# Patient Record
Sex: FEMALE | NOT HISPANIC OR LATINO | Employment: STUDENT | URBAN - METROPOLITAN AREA
[De-identification: names, ages, dates, MRNs, and addresses within clinical notes are randomized per-mention and may not be internally consistent; named-entity substitution may affect disease eponyms.]

---

## 2022-10-20 ENCOUNTER — OFFICE VISIT (OUTPATIENT)
Dept: GASTROENTEROLOGY | Facility: CLINIC | Age: 22
End: 2022-10-20
Payer: COMMERCIAL

## 2022-10-20 ENCOUNTER — TELEPHONE (OUTPATIENT)
Dept: GASTROENTEROLOGY | Facility: CLINIC | Age: 22
End: 2022-10-20

## 2022-10-20 VITALS
BODY MASS INDEX: 28.32 KG/M2 | HEIGHT: 65 IN | SYSTOLIC BLOOD PRESSURE: 112 MMHG | WEIGHT: 170 LBS | DIASTOLIC BLOOD PRESSURE: 62 MMHG

## 2022-10-20 DIAGNOSIS — K52.9 COLITIS: Primary | ICD-10-CM

## 2022-10-20 PROCEDURE — 99204 OFFICE O/P NEW MOD 45 MIN: CPT | Performed by: INTERNAL MEDICINE

## 2022-10-20 NOTE — PROGRESS NOTES
[id number] - Outpatient Consultation  Cambria Laxmi 23 y o  female MRN: [id number]  Encounter: [id number]          ASSESSMENT AND PLAN:      1  Colitis  Infectious colitis versus inflammatory bowel disease? With recurrent episodes and a biopsy showing chronic colitis I suspect she has inflammatory bowel disease (Crohn's disease) although she is completely asymptomatic at this point time  Had a long discussion with her and her mother about inflammatory bowel disease and where we go from here  I am reluctant to start aggressively treating her for Crohn's disease being she is asymptomatic  I gave her 2 options of either repeating her colonoscopy now and reassessing the colon 2 months after the acute episode verses continue to follow and consider reimaging or colonoscope being if she has recurrent GI symptoms  She reports that she is in the last year college and is currently taking the [employer] in hopes of getting into medical school next year so would like to get things completely worked out prior to the spring semester  She is interested in pursuing a colonoscopy now  - Colonoscopy; Future    ______________________________________________________________________    HPI:  The patient is seen in the office today for evaluation of 2 episodes of colitis  She reports in January presented to the emergency room with the acute onset of right lower quadrant abdominal pain  In the emergency room she had blood work and an ultrasound that was unrevealing  She was discharged and reports by the next morning felt well  She was doing well until August when while vacationing in [facility] develops severe right-sided abdominal pain radiating to her back  This was similar quality as the January episode but just more severe  She went to the emergency room  Was found to have elevated white count and sed rate  A CT scan showed a high riding cecum with moderate amount of right-sided colitis  She was treated with what sounds like steroids and antibiotics  She had a negative EGD but then a colonoscopy which showed colitis in the right colon  It does not look like the terminal ileum was evaluated  The impression was infectious colitis verses IBD  Biopsies revealed acute chronic colitis  Patient was discharged on antibiotics and sulfasalazine which she took for 2 weeks  She was told that she should follow-up with a gastroenterologist in a night states when she returned home  She reports that she currently moves her bowels about twice a day  They are formed  She denies any rectal bleeding  She denies any abdominal pain  She denies any upper GI symptoms  The only change in her diet she made is that she is avoiding spicy foods  Her weight is stable  She denies any arthritis or arthralgias  REVIEW OF SYSTEMS:    CONSTITUTIONAL: Denies any fever, chills, rigors, and weight loss  HEENT: No earache or tinnitus  Denies hearing loss or visual disturbances  CARDIOVASCULAR: No chest pain or palpitations  RESPIRATORY: Denies any cough, hemoptysis, shortness of breath or dyspnea on exertion  GASTROINTESTINAL: As noted in the History of Present Illness  GENITOURINARY: No problems with urination  Denies any hematuria or dysuria  NEUROLOGIC: No dizziness or vertigo, denies headaches  MUSCULOSKELETAL: Denies any muscle or joint pain  SKIN: Denies skin rashes or itching  ENDOCRINE: Denies excessive thirst  Denies intolerance to heat or cold  PSYCHOSOCIAL: Denies depression or anxiety  Denies any recent memory loss  Historical Information   History reviewed  No pertinent past medical history  History reviewed  No pertinent surgical history    Social History   Social History     Substance and Sexual Activity   Alcohol Use None     Social History     Substance and Sexual Activity   Drug Use Never     Social History     Tobacco Use   Smoking Status Current Some Day Smoker   • Types: E-Cigarettes   Smokeless Tobacco Never Used     History reviewed  No pertinent family history  Meds/Allergies     No current outpatient medications on file  No Known Allergies        Objective     Blood pressure 112/62, height 5' 5" (1 651 m), weight 77 1 kg (170 lb)  Body mass index is 28 29 kg/m²  PHYSICAL EXAM:      General Appearance:   Alert, cooperative, no distress   HEENT:   Normocephalic, atraumatic, anicteric      Neck:  Supple, symmetrical, trachea midline   Lungs:   Clear to auscultation bilaterally; no rales, rhonchi or wheezing; respirations unlabored    Heart[de-identified]   Regular rate and rhythm; no murmur, rub, or gallop  Abdomen:   Soft, non-tender, non-distended; normal bowel sounds; no masses, no organomegaly    Genitalia:   Deferred    Rectal:   Deferred    Extremities:  No cyanosis, clubbing or edema    Pulses:  2+ and symmetric    Skin:  No jaundice, rashes, or lesions    Lymph nodes:  No palpable cervical lymphadenopathy        Lab Results:   Hemoglobin 14 2, hematocrit 41 3, white count 12 4, platelet count 968  ALT 20, AST 23, alkaline phosphatase 81, bilirubin 0 5, albumin 3 4  Sed rate 32  CRP 0 1   Sodium 137, potassium 3 4, chloride 103, bicarb 16, BUN 8, creatinine 0 75  Lipase 12  (8/10/22)    Radiology Results:   CT scan of the abdomen  August 10, 2022  Cecum is high up in position located at the subhepatic region  Marked mural thickening and edema involving the cecum and ascending colon with stranding of pericolonic fat planes and multiple ileocolonic lymph nodes    Mildly enlarged fatty liver

## 2022-10-20 NOTE — LETTER
October 20, 2022     [id number]    Patient: Troy Garcia   YOB: 2000   Date of Visit: 10/20/2022       Dear Dr Pierce Brunner: Thank you for referring Troy Garcia to me for evaluation  Below are my notes for this consultation  If you have questions, please do not hesitate to call me  I look forward to following your patient along with you  Sincerely,        Laverne Babin MD        CC: No Recipients  Laverne Babin MD  10/20/2022  5:27 PM  Incomplete  [id number] - Outpatient [facility]  Troy Garcia 23 y o  female MRN: [id number]  Encounter: [id number]          ASSESSMENT AND PLAN:      1  Colitis  Infectious colitis versus inflammatory bowel disease? With recurrent episodes and a biopsy showing chronic colitis I suspect she has inflammatory bowel disease (Crohn's disease) although she is completely asymptomatic at this point time  Had a long discussion with her and her mother about inflammatory bowel disease and where we go from here  I am reluctant to start aggressively treating her for Crohn's disease being she is asymptomatic  I gave her 2 options of either repeating her colonoscopy now and reassessing the colon 2 months after the acute episode verses continue to follow and consider reimaging or colonoscope being if she has recurrent GI symptoms  She reports that she is in the last year college and is currently taking the [employer] in hopes of getting into medical school next year so would like to get things completely worked out prior to the spring semester  She is interested in pursuing a colonoscopy now  - Colonoscopy; Future    ______________________________________________________________________    HPI:  The patient is seen in the office today for evaluation of 2 episodes of colitis    She reports in January presented to the emergency room with the acute onset of right lower quadrant abdominal pain  In the emergency room she had blood work and an ultrasound that was unrevealing  She was discharged and reports by the next morning felt well  She was doing well until August when while vacationing in [facility] develops severe right-sided abdominal pain radiating to her back  This was similar quality as the January episode but just more severe  She went to the emergency room  Was found to have elevated white count and sed rate  A CT scan showed a high riding cecum with moderate amount of right-sided colitis  She was treated with what sounds like steroids and antibiotics  She had a negative EGD but then a colonoscopy which showed colitis in the right colon  It does not look like the terminal ileum was evaluated  The impression was infectious colitis verses IBD  Biopsies revealed acute chronic colitis  Patient was discharged on antibiotics and sulfasalazine which she took for 2 weeks  She was told that she should follow-up with a gastroenterologist in a night states when she returned home  She reports that she currently moves her bowels about twice a day  They are formed  She denies any rectal bleeding  She denies any abdominal pain  She denies any upper GI symptoms  The only change in her diet she made is that she is avoiding spicy foods  Her weight is stable  She denies any arthritis or arthralgias  REVIEW OF SYSTEMS:    CONSTITUTIONAL: Denies any fever, chills, rigors, and weight loss  HEENT: No earache or tinnitus  Denies hearing loss or visual disturbances  CARDIOVASCULAR: No chest pain or palpitations  RESPIRATORY: Denies any cough, hemoptysis, shortness of breath or dyspnea on exertion  GASTROINTESTINAL: As noted in the History of Present Illness  GENITOURINARY: No problems with urination  Denies any hematuria or dysuria  NEUROLOGIC: No dizziness or vertigo, denies headaches  MUSCULOSKELETAL: Denies any muscle or joint pain     SKIN: Denies skin rashes or itching  ENDOCRINE: Denies excessive thirst  Denies intolerance to heat or cold  PSYCHOSOCIAL: Denies depression or anxiety  Denies any recent memory loss  Historical Information   History reviewed  No pertinent past medical history  History reviewed  No pertinent surgical history  Social History   Social History     Substance and Sexual Activity   Alcohol Use None     Social History     Substance and Sexual Activity   Drug Use Never     Social History     Tobacco Use   Smoking Status Current Some Day Smoker   • Types: E-Cigarettes   Smokeless Tobacco Never Used     History reviewed  No pertinent family history  Meds/Allergies     No current outpatient medications on file  No Known Allergies        Objective     Blood pressure 112/62, height 5' 5" (1 651 m), weight 77 1 kg (170 lb)  Body mass index is 28 29 kg/m²  PHYSICAL EXAM:      General Appearance:   Alert, cooperative, no distress   HEENT:   Normocephalic, atraumatic, anicteric      Neck:  Supple, symmetrical, trachea midline   Lungs:   Clear to auscultation bilaterally; no rales, rhonchi or wheezing; respirations unlabored    Heart[de-identified]   Regular rate and rhythm; no murmur, rub, or gallop  Abdomen:   Soft, non-tender, non-distended; normal bowel sounds; no masses, no organomegaly    Genitalia:   Deferred    Rectal:   Deferred    Extremities:  No cyanosis, clubbing or edema    Pulses:  2+ and symmetric    Skin:  No jaundice, rashes, or lesions    Lymph nodes:  No palpable cervical lymphadenopathy        Lab Results:   Hemoglobin 14 2, hematocrit 41 3, white count 12 4, platelet count 866  ALT 20, AST 23, alkaline phosphatase 81, bilirubin 0 5, albumin 3 4  Sed rate 32  CRP 0 1   Sodium 137, potassium 3 4, chloride 103, bicarb 16, BUN 8, creatinine 0 75  Lipase 12  (8/10/22)    Radiology Results:   CT scan of the abdomen  August 10, 2022  Cecum is high up in position located at the subhepatic region    Marked mural thickening and edema involving the cecum and ascending colon with stranding of pericolonic fat planes and multiple ileocolonic lymph nodes    Mildly enlarged fatty liver

## 2022-10-20 NOTE — TELEPHONE ENCOUNTER
Scheduled date of colonoscopy (as of today):11/21/2022  Physician performing colonoscopy:Dr Sabina Yang  Location of colonoscopy:[facility]  Bowel prep reviewed with patient:Raven Cordova  Instructions reviewed with patient by:Raven Forman  Clearances: No

## 2022-10-27 ENCOUNTER — TELEPHONE (OUTPATIENT)
Dept: GASTROENTEROLOGY | Facility: AMBULARY SURGERY CENTER | Age: 22
End: 2022-10-27

## 2022-10-27 NOTE — LETTER
October 31, 2022    Patient: Hudson Ray  YOB: 2000    To Whom It May Concern,    Please excuse [employer] from school on October 20, 2022  She had some medical issues that needed attention and was seen in our office  If you have any questions you may contact me at [phone]                        Sincerely,         Mohinder Sue MD

## 2022-10-27 NOTE — TELEPHONE ENCOUNTER
Patients GI provider:  Dr Sirisha Dudley    Number to return call: [phone]     Reason for call: Pt calling requesting to speak with someone in the office to get a letter for school stating that she had an appt on 10-22-22    Scheduled procedure/appointment date if applicable: Apt/procedure 11/21/22

## 2022-10-31 NOTE — TELEPHONE ENCOUNTER
Called pt back, she is requesting a letter excusing her from school on the date of her ov 10/20        Letter written and emailed to [email or web address]

## 2022-11-21 ENCOUNTER — ANESTHESIA EVENT (OUTPATIENT)
Dept: GASTROENTEROLOGY | Facility: AMBULATORY SURGERY CENTER | Age: 22
End: 2022-11-21

## 2022-11-21 ENCOUNTER — ANESTHESIA (OUTPATIENT)
Dept: GASTROENTEROLOGY | Facility: AMBULATORY SURGERY CENTER | Age: 22
End: 2022-11-21

## 2022-11-21 ENCOUNTER — HOSPITAL ENCOUNTER (OUTPATIENT)
Dept: GASTROENTEROLOGY | Facility: AMBULATORY SURGERY CENTER | Age: 22
Discharge: HOME | End: 2022-11-21

## 2022-11-21 VITALS
OXYGEN SATURATION: 99 % | RESPIRATION RATE: 15 BRPM | HEIGHT: 65 IN | WEIGHT: 165 LBS | HEART RATE: 70 BPM | SYSTOLIC BLOOD PRESSURE: 117 MMHG | TEMPERATURE: 98.9 F | DIASTOLIC BLOOD PRESSURE: 56 MMHG | BODY MASS INDEX: 27.49 KG/M2

## 2022-11-21 DIAGNOSIS — K52.9 COLITIS: ICD-10-CM

## 2022-11-21 LAB
EXT PREGNANCY TEST URINE: NEGATIVE
EXT. CONTROL: NORMAL

## 2022-11-21 RX ORDER — SODIUM CHLORIDE, SODIUM LACTATE, POTASSIUM CHLORIDE, CALCIUM CHLORIDE 600; 310; 30; 20 MG/100ML; MG/100ML; MG/100ML; MG/100ML
50 INJECTION, SOLUTION INTRAVENOUS CONTINUOUS
Status: DISCONTINUED | OUTPATIENT
Start: 2022-11-21 | End: 2022-11-21

## 2022-11-21 RX ORDER — PROPOFOL 10 MG/ML
INJECTION, EMULSION INTRAVENOUS AS NEEDED
Status: DISCONTINUED | OUTPATIENT
Start: 2022-11-21 | End: 2022-11-21

## 2022-11-21 RX ADMIN — SODIUM CHLORIDE, SODIUM LACTATE, POTASSIUM CHLORIDE, CALCIUM CHLORIDE 50 ML/HR: 600; 310; 30; 20 INJECTION, SOLUTION INTRAVENOUS at 15:18

## 2022-11-21 RX ADMIN — PROPOFOL 600 MG: 10 INJECTION, EMULSION INTRAVENOUS at 15:54

## 2022-11-21 NOTE — ANESTHESIA PREPROCEDURE EVALUATION
Procedure:  COLONOSCOPY    Relevant Problems   No relevant active problems        Physical Exam    Airway    Mallampati score: I  TM Distance: >3 FB  Neck ROM: full     Dental       Cardiovascular  Cardiovascular exam normal    Pulmonary  Pulmonary exam normal     Other Findings        Anesthesia Plan  ASA Score- 1     Anesthesia Type- IV sedation with anesthesia with ASA Monitors  Additional Monitors:   Airway Plan:           Plan Factors-Exercise tolerance (METS): >4 METS  Chart reviewed  EKG reviewed  Imaging results reviewed  Existing labs reviewed  Patient summary reviewed  Induction- intravenous  Postoperative Plan- Plan for postoperative opioid use  Planned trial extubation    Informed Consent- Anesthetic plan and risks discussed with patient  I personally reviewed this patient with the CRNA  Discussed and agreed on the Anesthesia Plan with the CRNA  Waleska Whalen

## 2022-11-21 NOTE — ANESTHESIA POSTPROCEDURE EVALUATION
Post-Op Assessment Note    CV Status:  Stable    Pain management: adequate  Multimodal analgesia used between 6 hours prior to anesthesia start to PACU discharge    Mental Status:  Sleepy   PONV Controlled:  Controlled   Airway Patency:  Patent   Two or more mitigation strategies used for obstructive sleep apnea   Post Op Vitals Reviewed: Yes      Staff: Anesthesiologist         No notable events documented      BP      Temp      Pulse     Resp      SpO2

## 2022-11-22 ENCOUNTER — TELEPHONE (OUTPATIENT)
Dept: GASTROENTEROLOGY | Facility: CLINIC | Age: 22
End: 2022-11-22

## 2022-11-22 NOTE — LETTER
2022    RE: Lynn Nadia  : 2000      To whom it may concern:    Gabby Pandya is under our care and is being treated for an illness/medical condition  She required time off  and 2022 due to this  Please call our office if needed      Sincerely,  Neeru Mederos MD

## 2022-11-22 NOTE — TELEPHONE ENCOUNTER
Patients GI provider:  Dr Ana Carrera    Number to return call: [phone]    Reason for call: Pt calling stating she had procedure yesterday and is still feeling fatigued from anesthesia      Scheduled procedure/appointment date if applicable: N/A

## 2022-12-06 ENCOUNTER — TELEPHONE (OUTPATIENT)
Dept: GASTROENTEROLOGY | Facility: CLINIC | Age: 22
End: 2022-12-06

## 2022-12-06 NOTE — TELEPHONE ENCOUNTER
----- Message from Lonnie Gavin sent at 12/5/2022  4:52 PM EST -----  Regarding: sched capsule    ----- Message -----  From: Becky Lloyd MD  Sent: 12/5/2022   3:33 PM EST  To: Lonnie Gavin    Discussed with patient  Biopsies negative    Proceed with capsule endoscopy

## 2023-01-12 ENCOUNTER — CLINICAL SUPPORT (OUTPATIENT)
Dept: GASTROENTEROLOGY | Facility: CLINIC | Age: 23
End: 2023-01-12

## 2023-01-12 ENCOUNTER — TELEPHONE (OUTPATIENT)
Dept: GASTROENTEROLOGY | Facility: CLINIC | Age: 23
End: 2023-01-12

## 2023-01-12 DIAGNOSIS — K52.9 COLITIS: ICD-10-CM

## 2023-01-12 NOTE — Clinical Note
I ordered a KUB to confirm capsule has passed, unsure if usually [facility] or [facility]  If capsule has passed, plan MRE

## 2023-01-12 NOTE — LETTER
2023    To Whom It May Concern:    Les Rodriguez ( 2000) is currently having a diagnostic test performed  If she has any nausea, abdominal pain, or vomiting it is necessary that she report to an Emergency Department as soon as possible  If there are any questions, please do not hesitate to call      Femi Le MD

## 2023-01-23 NOTE — PROGRESS NOTES
Capsule endoscopy report    Indication: Questionable history of inflammatory bowel disease, ileum could not intubated on colonoscopy    Procedure: Good prep  Normal transit through the esophagus and stomach  The capsule does not reach the cecum at the conclusion of the procedure at 7 hours 52 minutes  All visualized mucosa was normal    Plan:    Recommend KUB to confirm that the PillCam has passed  ,  Then plan MRE to further evaluate for small bowel Crohn's  Discussed with Dr Tolu Cerrato who did the office consult and colonoscopy  I left the patient a voicemail with results and recommendations

## 2023-01-24 ENCOUNTER — TELEPHONE (OUTPATIENT)
Dept: GASTROENTEROLOGY | Facility: CLINIC | Age: 23
End: 2023-01-24

## 2023-01-24 NOTE — PROGRESS NOTES
I called patient [phone], requested that she call Jose Almaraz back as Katelin Teague wants her to have an x-ray to ensure the capsule has passed   Not sure what facility she prefers to go to?

## 2023-01-24 NOTE — TELEPHONE ENCOUNTER
Capsule endoscopy report     Indication: Questionable history of inflammatory bowel disease, ileum could not intubated on colonoscopy     Procedure: Good prep  Normal transit through the esophagus and stomach  The capsule does not reach the cecum at the conclusion of the procedure at 7 hours 52 minutes  All visualized mucosa was normal    [facility]  Plan:    Recommend KUB to confirm that the PillCam has passed  ,  Then plan MRE to further evaluate for small bowel Crohn's  Discussed with Dr Raf Antonio who did the office consult and colonoscopy      I left the patient a voicemail with results and recommendations  Merary Leo RN     Status: Signed   1/24/2023 I called patient [phone], requested that she call Rosanna Navarro back as Jarret Castro wants her to have an x-ray to ensure the capsule has passed   Not sure what facility she prefers to go to?

## 2023-01-25 NOTE — TELEPHONE ENCOUNTER
I spoke with patient, she prefers to go to [facility] for both her KUB and MRE  She will go in the next few days for her KUB  I faxed the order to [facility] and emailed her a copy [email or web address]  MRE precert for [facility] will also need to be initiated

## 2024-10-23 ENCOUNTER — NURSE TRIAGE (OUTPATIENT)
Age: 24
End: 2024-10-23

## 2024-10-23 ENCOUNTER — TELEPHONE (OUTPATIENT)
Age: 24
End: 2024-10-23

## 2024-10-23 NOTE — TELEPHONE ENCOUNTER
Patient called in to schedule hospital f/u with Dr. Sanchez only at the [place] office. Pt was transferred over to BALDOMERO Sweet for further assistance.

## 2024-10-23 NOTE — TELEPHONE ENCOUNTER
"LOV 10/20/22, F/U 01/13/25    Pt transferred to myself by Paula.    Pt recently seen by [facility] for approximately 1 week of nausea, intermittent vomiting, bloating and lower abdominal pain that radiated \"to my colon and back\", rated 6-8/10. Pt reports she was given the option for admit or discharge on BRAT diet. Pt opted for discharge with self-care. BRAT diet was not helpful and pt started on liquid diet. Pt reports mixed episodes of urgent, dark, diarrhea and constipation during that week. Pt took 1-2 doses of charcoal daily between 10/18-10/20. C-Diff testing came back negative. Symptoms are currently resolved. Pt reports she is taking Benefiber daily. She will start monitoring fluid intake and increase to 64 oz daily. If constipation develops in the future she is recommended  to use Miralax 1-2 capfuls daily; she is to titrate as needed. Should symptoms return she is advised to call office for triage. Pt is agreeable. Follow up appointment made with  01/13/25      Reason for Disposition   MILD-MODERATE diarrhea (e.g., 1-6 times / day more than normal)    Answer Assessment - Initial Assessment Questions  1. DIARRHEA SEVERITY: \"How bad is the diarrhea?\" \"How many more stools have you had in the past 24 hours than normal?\"       4-5 episodes of diarrhea   2. ONSET: \"When did the diarrhea begin?\"       Last week  3. STOOL DESCRIPTION:  \"How loose or watery is the diarrhea?\" \"What is the stool color?\" \"Is there any blood or mucous in the stool?\"      loose  4. VOMITING: \"Are you also vomiting?\" If Yes, ask: \"How many times in the past 24 hours?\"       yes  5. ABDOMEN PAIN: \"Are you having any abdomen pain?\" If Yes, ask: \"What does it feel like?\" (e.g., crampy, dull, intermittent, constant)       Bloating radiate to back  6. ABDOMEN PAIN SEVERITY: If present, ask: \"How bad is the pain?\"  (e.g., Scale 1-10; mild, moderate, or severe)      Intermittent 6-8/10    10. ANTIBIOTIC USE: \"Are you taking " "antibiotics now or have you taken antibiotics in the past 2 months?\"        denies  11. OTHER SYMPTOMS: \"Do you have any other symptoms?\" (e.g., fever, blood in stool)        Nausea,diarrhea, abdominal pain,bloating, dark stool  12. PREGNANCY: \"Is there any chance you are pregnant?\" \"When was your last menstrual period?\"        denies    Protocols used: Diarrhea-Adult-[place]    "

## 2025-01-13 ENCOUNTER — TELEPHONE (OUTPATIENT)
Dept: GASTROENTEROLOGY | Facility: CLINIC | Age: 25
End: 2025-01-13